# Patient Record
Sex: FEMALE | Race: WHITE | URBAN - METROPOLITAN AREA
[De-identification: names, ages, dates, MRNs, and addresses within clinical notes are randomized per-mention and may not be internally consistent; named-entity substitution may affect disease eponyms.]

---

## 2023-01-13 ENCOUNTER — APPOINTMENT (RX ONLY)
Dept: URBAN - METROPOLITAN AREA CLINIC 164 | Facility: CLINIC | Age: 43
Setting detail: DERMATOLOGY
End: 2023-01-13

## 2023-01-13 DIAGNOSIS — D22 MELANOCYTIC NEVI: ICD-10-CM

## 2023-01-13 DIAGNOSIS — L90.8 OTHER ATROPHIC DISORDERS OF SKIN: ICD-10-CM | Status: INADEQUATELY CONTROLLED

## 2023-01-13 PROBLEM — D48.5 NEOPLASM OF UNCERTAIN BEHAVIOR OF SKIN: Status: ACTIVE | Noted: 2023-01-13

## 2023-01-13 PROCEDURE — ? BIOPSY BY SHAVE METHOD

## 2023-01-13 PROCEDURE — ? PRESCRIPTION

## 2023-01-13 PROCEDURE — ? COUNSELING

## 2023-01-13 PROCEDURE — 11103 TANGNTL BX SKIN EA SEP/ADDL: CPT

## 2023-01-13 PROCEDURE — 99204 OFFICE O/P NEW MOD 45 MIN: CPT | Mod: 25

## 2023-01-13 PROCEDURE — 11102 TANGNTL BX SKIN SINGLE LES: CPT

## 2023-01-13 RX ORDER — TRETIONIN 1 MG/G
CREAM TOPICAL
Qty: 45 | Refills: 5 | Status: ERX | COMMUNITY
Start: 2023-01-13

## 2023-01-13 RX ADMIN — TRETIONIN: 1 CREAM TOPICAL at 00:00

## 2023-01-13 ASSESSMENT — LOCATION SIMPLE DESCRIPTION DERM: LOCATION SIMPLE: RIGHT CHEEK

## 2023-01-13 ASSESSMENT — LOCATION ZONE DERM: LOCATION ZONE: FACE

## 2023-01-13 ASSESSMENT — LOCATION DETAILED DESCRIPTION DERM
LOCATION DETAILED: RIGHT SUPERIOR LATERAL MALAR CHEEK
LOCATION DETAILED: RIGHT INFERIOR CENTRAL MALAR CHEEK

## 2023-01-13 NOTE — PROCEDURE: BIOPSY BY SHAVE METHOD
Detail Level: Detailed
Depth Of Biopsy: dermis
Was A Bandage Applied: Yes
Size Of Lesion In Cm: 0
Biopsy Type: H and E
Biopsy Method: Dermablade
Anesthesia Type: 1% Xylocaine with epinephrine
Anesthesia Volume In Cc (Will Not Render If 0): 1.5
Hemostasis: Ophelia's
Wound Care: Petrolatum
Dressing: bandage
Destruction After The Procedure: No
Type Of Destruction Used: Curettage
Curettage Text: The wound bed was treated with curettage after the biopsy was performed.
Cryotherapy Text: The wound bed was treated with cryotherapy after the biopsy was performed.
Electrodesiccation Text: The wound bed was treated with electrodesiccation after the biopsy was performed.
Electrodesiccation And Curettage Text: The wound bed was treated with electrodesiccation and curettage after the biopsy was performed.
Silver Nitrate Text: The wound bed was treated with silver nitrate after the biopsy was performed.
Lab: 6
Lab Facility: 3
Path Notes (To The Dermatopathologist): Check margins
Consent: Written consent was obtained and risks were reviewed including but not limited to scarring, infection, bleeding, scabbing, incomplete removal, nerve damage and allergy to anesthesia.
Post-Care Instructions: I reviewed with the patient in detail post-care instructions. Patient is to keep the biopsy site dry overnight, and then apply bacitracin twice daily until healed. Patient may apply hydrogen peroxide soaks to remove any crusting.
Notification Instructions: Patient will be notified of biopsy results. However, patient instructed to call the office if not contacted within 2 weeks.
Billing Type: Third-Party Bill
Information: Selecting Yes will display possible errors in your note based on the variables you have selected. This validation is only offered as a suggestion for you. PLEASE NOTE THAT THE VALIDATION TEXT WILL BE REMOVED WHEN YOU FINALIZE YOUR NOTE. IF YOU WANT TO FAX A PRELIMINARY NOTE YOU WILL NEED TO TOGGLE THIS TO 'NO' IF YOU DO NOT WANT IT IN YOUR FAXED NOTE.

## 2023-01-20 ENCOUNTER — APPOINTMENT (RX ONLY)
Dept: URBAN - METROPOLITAN AREA CLINIC 164 | Facility: CLINIC | Age: 43
Setting detail: DERMATOLOGY
End: 2023-01-20

## 2023-01-20 DIAGNOSIS — Z48.817 ENCOUNTER FOR SURGICAL AFTERCARE FOLLOWING SURGERY ON THE SKIN AND SUBCUTANEOUS TISSUE: ICD-10-CM

## 2023-01-20 PROCEDURE — ? POST-OP WOUND EVALUATION

## 2023-01-20 PROCEDURE — 99024 POSTOP FOLLOW-UP VISIT: CPT

## 2023-01-20 ASSESSMENT — LOCATION ZONE DERM: LOCATION ZONE: FACE

## 2023-01-20 ASSESSMENT — LOCATION DETAILED DESCRIPTION DERM: LOCATION DETAILED: RIGHT INFERIOR CENTRAL MALAR CHEEK

## 2023-01-20 ASSESSMENT — LOCATION SIMPLE DESCRIPTION DERM: LOCATION SIMPLE: RIGHT CHEEK

## 2023-01-20 NOTE — PROCEDURE: POST-OP WOUND EVALUATION
Detail Level: Detailed
Add 50407 Cpt? (Important Note: In 2017 The Use Of 60718 Is Being Tracked By Cms To Determine Future Global Period Reimbursement For Global Periods): yes
Wound Evaluated By (Optional): Maribel
Wound Diameter In Cm(Optional): 0

## 2023-09-27 ENCOUNTER — APPOINTMENT (RX ONLY)
Dept: URBAN - METROPOLITAN AREA CLINIC 164 | Facility: CLINIC | Age: 43
Setting detail: DERMATOLOGY
End: 2023-09-27

## 2023-09-27 DIAGNOSIS — Z41.9 ENCOUNTER FOR PROCEDURE FOR PURPOSES OTHER THAN REMEDYING HEALTH STATE, UNSPECIFIED: ICD-10-CM

## 2023-09-27 PROCEDURE — ? COSMETIC CONSULTATION: FILLERS

## 2023-09-27 PROCEDURE — ? COSMETIC CONSULTATION: CHEMICAL PEELS

## 2023-09-27 PROCEDURE — ? ADDITIONAL NOTES

## 2023-09-27 PROCEDURE — ? COSMETIC QUOTE

## 2023-09-27 PROCEDURE — ? COSMETIC CONSULTATION: HYDRAFACIAL

## 2023-09-27 PROCEDURE — ? COSMETIC CONSULTATION: BOTOX

## 2023-09-27 ASSESSMENT — LOCATION ZONE DERM: LOCATION ZONE: FACE

## 2023-09-27 ASSESSMENT — LOCATION SIMPLE DESCRIPTION DERM: LOCATION SIMPLE: INFERIOR FOREHEAD

## 2023-09-27 ASSESSMENT — LOCATION DETAILED DESCRIPTION DERM: LOCATION DETAILED: INFERIOR MID FOREHEAD

## 2023-09-27 NOTE — PROCEDURE: COSMETIC CONSULTATION: CHEMICAL PEELS
Price (Use Numbers Only, No Special Characters Or $): 389 Consultation Charge $ (Use Numbers Only, No Special Characters Or $): 335

## 2023-09-27 NOTE — PROCEDURE: COSMETIC CONSULTATION: HYDRAFACIAL
Price (Use Numbers Only, No Special Characters Or $): 391 Price (Use Numbers Only, No Special Characters Or $): 793

## 2023-09-27 NOTE — PROCEDURE: COSMETIC QUOTE
Laser 4 Price/Unit (In Dollars- Use Only Numbers And Decimals): 0.00
Injectable 9 Units: 0
Body Procedure 4: SkinPen
Face Procedure 5: Vi Peel Purify w/ Precision Plus
Apply Facility Fee: No
Face Procedure 5 Units: 3
Body Procedure 1: VI Peel Body (small area)
Face Procedure 9 Price/Unit (In Dollars- Use Only Numbers And Decimals): 150
Facility Fee Units (Optional): 1
Face Procedure 6 Price/Unit (In Dollars- Use Only Numbers And Decimals): 369.55
Body Procedure 2 Price/Unit (In Dollars- Use Only Numbers And Decimals): 450
Face Procedure 3 Price/Unit (In Dollars- Use Only Numbers And Decimals): 265
Detail Level: Zone
Include Sales Tax On Surgeon's Fees: Yes
Misc Procedure 1 Price/Unit (In Dollars- Use Only Numbers And Decimals): 499
Face Procedure 10 Ramos/Unit (In Dollars- Use Only Numbers And Decimals): 329
Face Procedure 7 Price/Unit (In Dollars- Use Only Numbers And Decimals): 199
Body Procedure 3 Price/Unit (In Dollars- Use Only Numbers And Decimals): 365
Face Procedure 4 Price/Unit (In Dollars- Use Only Numbers And Decimals): 369
Face Procedure 8: ZO 3 Step Peel
Face Procedure 1 Price/Unit (In Dollars- Use Only Numbers And Decimals): 125
Face Procedure 8 Price/Unit (In Dollars- Use Only Numbers And Decimals): 319
Face Procedure 5 Price/Unit (In Dollars- Use Only Numbers And Decimals): 376.03
Face Procedure 9: Clinical Facial
Face Procedure 2 Price/Unit (In Dollars- Use Only Numbers And Decimals): 318.03
Body Procedure 1 Price/Unit (In Dollars- Use Only Numbers And Decimals): 299
Face Procedure 6: VI Peel Precision Plus
Face Procedure 3: HydraFacial (Deluxe)
Body Procedure 2: VI Peel Body (large area)
Misc Procedure 1: HydraFacial Keravive
Face Procedure 10: HydraFacial (Platinum)
Face Procedure 7: HydraFacial (Signature)
Body Procedure 3: HydraFacial (Back)
Face Procedure 4: Vi Peel (Advanced)
Face Procedure 1: Milia extraction

## 2023-09-27 NOTE — PROCEDURE: ADDITIONAL NOTES
Detail Level: Zone
Additional Notes: Patient was referred by TERESE Degroot. Patients concerns are acne, sun spots & wrinkles. Ellie is preforming filler on October 7th. I recommended monthly HydraFacials Deluxe to keep the pores cleaned & unclogged. I also suggested a package of 3 Chemical Peels (Purify w/ Precision Plus). If patient was to purchase a package of peels I would honor it at a discounted rate. \\n\\nPatient was pleased with consult & will call to schedule any future treatments.
Render Risk Assessment In Note?: no

## 2023-09-27 NOTE — PROCEDURE: ADDITIONAL NOTES
Additional Notes: Botox-34 units glabella, forehead, lip line\\nFillers-\\nCheeks- voluma $900/syringe\\nMarionette lines- vollure $900/syringe vs ultra plus $700/syringe
Detail Level: Zone
Render Risk Assessment In Note?: no

## 2023-10-07 ENCOUNTER — APPOINTMENT (RX ONLY)
Dept: URBAN - METROPOLITAN AREA CLINIC 164 | Facility: CLINIC | Age: 43
Setting detail: DERMATOLOGY
End: 2023-10-07

## 2023-10-07 DIAGNOSIS — Z41.9 ENCOUNTER FOR PROCEDURE FOR PURPOSES OTHER THAN REMEDYING HEALTH STATE, UNSPECIFIED: ICD-10-CM

## 2023-10-07 PROCEDURE — ? BOTOX (U OR CC)

## 2023-10-07 PROCEDURE — ? JUVEDERM VOLLURE XC INJECTION

## 2023-10-07 ASSESSMENT — LOCATION DETAILED DESCRIPTION DERM
LOCATION DETAILED: RIGHT UPPER CUTANEOUS LIP
LOCATION DETAILED: LEFT INFERIOR MEDIAL MALAR CHEEK
LOCATION DETAILED: GLABELLA
LOCATION DETAILED: LEFT UPPER CUTANEOUS LIP
LOCATION DETAILED: RIGHT INFERIOR MEDIAL MALAR CHEEK
LOCATION DETAILED: LEFT INFERIOR TEMPLE
LOCATION DETAILED: RIGHT FOREHEAD
LOCATION DETAILED: LEFT FOREHEAD
LOCATION DETAILED: RIGHT LATERAL CANTHUS

## 2023-10-07 ASSESSMENT — LOCATION SIMPLE DESCRIPTION DERM
LOCATION SIMPLE: LEFT CHEEK
LOCATION SIMPLE: LEFT LIP
LOCATION SIMPLE: RIGHT FOREHEAD
LOCATION SIMPLE: LEFT TEMPLE
LOCATION SIMPLE: RIGHT EYELID
LOCATION SIMPLE: GLABELLA
LOCATION SIMPLE: LEFT FOREHEAD
LOCATION SIMPLE: RIGHT CHEEK
LOCATION SIMPLE: RIGHT LIP

## 2023-10-07 ASSESSMENT — LOCATION ZONE DERM
LOCATION ZONE: FACE
LOCATION ZONE: EYELID
LOCATION ZONE: LIP